# Patient Record
Sex: FEMALE | URBAN - METROPOLITAN AREA
[De-identification: names, ages, dates, MRNs, and addresses within clinical notes are randomized per-mention and may not be internally consistent; named-entity substitution may affect disease eponyms.]

---

## 2021-12-01 ENCOUNTER — OFFICE VISIT (OUTPATIENT)
Dept: URBAN - METROPOLITAN AREA CLINIC 88 | Facility: CLINIC | Age: 46
End: 2021-12-01

## 2023-10-02 ENCOUNTER — OFFICE VISIT (OUTPATIENT)
Dept: URBAN - METROPOLITAN AREA CLINIC 88 | Facility: CLINIC | Age: 48
End: 2023-10-02

## 2023-10-02 NOTE — HPI-TODAY'S VISIT:
Referred by Dr. Dane Foley Jr to schedule screening colonoscopy.  A copy of this note will be sent to the referring provider.  Denies undergoing prior colonoscopy or family history of colon cancer.  Patient has not had signs of rectal bleeding, changes in bowel habits, constipation, or diarrhea.

## 2023-10-11 ENCOUNTER — OFFICE VISIT (OUTPATIENT)
Dept: URBAN - METROPOLITAN AREA CLINIC 88 | Facility: CLINIC | Age: 48
End: 2023-10-11

## 2024-12-10 ENCOUNTER — OFFICE VISIT (OUTPATIENT)
Dept: URBAN - METROPOLITAN AREA CLINIC 88 | Facility: CLINIC | Age: 49
End: 2024-12-10

## 2024-12-17 ENCOUNTER — OFFICE VISIT (OUTPATIENT)
Dept: URBAN - METROPOLITAN AREA CLINIC 88 | Facility: CLINIC | Age: 49
End: 2024-12-17

## 2024-12-17 NOTE — HPI-TODAY'S VISIT:
49 y.o. referred by REINA Yarbrough to schedule screening colonoscopy.  Denies undergoing prior colonoscopy or family history of colon cancer.  Patient has not had signs of rectal bleeding, changes in bowel habits, constipation, or diarrhea.